# Patient Record
Sex: FEMALE | Race: BLACK OR AFRICAN AMERICAN | NOT HISPANIC OR LATINO
[De-identification: names, ages, dates, MRNs, and addresses within clinical notes are randomized per-mention and may not be internally consistent; named-entity substitution may affect disease eponyms.]

---

## 2019-08-03 ENCOUNTER — TRANSCRIPTION ENCOUNTER (OUTPATIENT)
Age: 24
End: 2019-08-03

## 2020-03-15 ENCOUNTER — EMERGENCY (EMERGENCY)
Facility: HOSPITAL | Age: 25
LOS: 1 days | Discharge: HOME | End: 2020-03-15
Attending: EMERGENCY MEDICINE
Payer: MEDICAID

## 2020-03-15 VITALS
SYSTOLIC BLOOD PRESSURE: 117 MMHG | RESPIRATION RATE: 16 BRPM | HEART RATE: 56 BPM | DIASTOLIC BLOOD PRESSURE: 75 MMHG | OXYGEN SATURATION: 100 % | TEMPERATURE: 98 F

## 2020-03-15 VITALS
HEART RATE: 72 BPM | RESPIRATION RATE: 16 BRPM | DIASTOLIC BLOOD PRESSURE: 62 MMHG | OXYGEN SATURATION: 100 % | TEMPERATURE: 99 F | SYSTOLIC BLOOD PRESSURE: 122 MMHG

## 2020-03-15 PROCEDURE — 93010 ELECTROCARDIOGRAM REPORT: CPT

## 2020-03-15 PROCEDURE — 99284 EMERGENCY DEPT VISIT MOD MDM: CPT

## 2020-03-15 PROCEDURE — 99283 EMERGENCY DEPT VISIT LOW MDM: CPT

## 2020-03-15 PROCEDURE — 93005 ELECTROCARDIOGRAM TRACING: CPT

## 2020-03-15 RX ORDER — MECLIZINE HCL 12.5 MG
1 TABLET ORAL
Qty: 12 | Refills: 0
Start: 2020-03-15 | End: 2020-03-18

## 2020-03-15 RX ORDER — IBUPROFEN 200 MG
600 TABLET ORAL ONCE
Refills: 0 | Status: COMPLETED | OUTPATIENT
Start: 2020-03-15 | End: 2020-03-15

## 2020-03-15 RX ORDER — MECLIZINE HCL 12.5 MG
25 TABLET ORAL ONCE
Refills: 0 | Status: COMPLETED | OUTPATIENT
Start: 2020-03-15 | End: 2020-03-15

## 2020-03-15 RX ORDER — ACETAMINOPHEN 500 MG
975 TABLET ORAL ONCE
Refills: 0 | Status: COMPLETED | OUTPATIENT
Start: 2020-03-15 | End: 2020-03-15

## 2020-03-15 RX ADMIN — Medication 600 MILLIGRAM(S): at 03:13

## 2020-03-15 RX ADMIN — Medication 25 MILLIGRAM(S): at 02:34

## 2020-03-15 RX ADMIN — Medication 975 MILLIGRAM(S): at 03:13

## 2020-03-15 NOTE — ED PROVIDER NOTE - ATTENDING CONTRIBUTION TO CARE
Attending MD Morales:  I personally have seen and examined this patient.  Resident note reviewed and agree on plan of care and except where noted.  See HPI, PE, and MDM for details.      24F with dizziness described as vertigo and intermitent chest pain x years. Non focal neuro exam, episodic/provoked vertigo thus do not suspect central etiology of symptoms. Chest pain atypical, ECG without ischemic changes, PERC negative so do not suspect PE. Plan for outpatient follow up with neurology and cardiology.

## 2020-03-15 NOTE — ED ADULT NURSE NOTE - HOW OFTEN DO YOU HAVE A DRINK CONTAINING ALCOHOL?
Monthly or less
I will STOP taking the medications listed below when I get home from the hospital:    Ventolin HFA 90 mcg/inh inhalation aerosol  -- 2 puff(s) inhaled 4 times a day    omeprazole 40 mg oral delayed release capsule  -- 1 cap(s) by mouth once a day    calcitriol 0.25 mcg oral capsule  -- 1 cap(s) by mouth once a day    meloxicam 7.5 mg oral tablet  -- 1 tab(s) by mouth once a day    alendronate weekly  --    Reglan 5 mg oral tablet  -- 1 tab(s) by mouth 4 times a day (before meals and at bedtime), As Needed    loratadine 10 mg oral tablet  -- 1 tab(s) by mouth once a day

## 2020-03-15 NOTE — ED PROVIDER NOTE - NSFOLLOWUPCLINICS_GEN_ALL_ED_FT
[facility]  Neurology  [address] [place] [address] [address] [place] [address]  Phone: [phone]  Fax:   Follow Up Time:

## 2020-03-15 NOTE — ED PROVIDER NOTE - PATIENT PORTAL LINK FT
You can access the FollowMyHealth Patient Portal offered by [facility] by registering at the following website: [email or web address]. By joining [employer]’s FollowMyHealth portal, you will also be able to view your health information using other applications (apps) compatible with our system.

## 2020-03-15 NOTE — ED PROVIDER NOTE - PROGRESS NOTE DETAILS
Jonathan Weil, PGY3 - Pt seen & reassessed. Remains clinically stable. Some improvement w/ meds. The patient feels comfortable going home at this juncture. Results from today's visit were reviewed with the patient and a copy of the results provided for their records. We discussed the plan for home care, the need for outpatient follow up, and return precautions. The patient expressed understanding of and agreement with the plan. All questions were addressed.

## 2020-03-15 NOTE — ED ADULT NURSE NOTE - OBJECTIVE STATEMENT
202 pt 24yf aox4 bib ems [fdny] c/o dizziness since 11am, room spinning, was sitting down at work, pt also states saw her pmd dr padgett wed 3/11/20 and started on tamiflu, denies exposure, vss no distress pending dispo/gc

## 2020-03-15 NOTE — ED PROVIDER NOTE - NSFOLLOWUPINSTRUCTIONS_ED_ALL_ED_FT
Follow up with your primary physician in 2-3 days. If needed call [phone] to find a primary care physician or call  [phone] to schedule an appointment with the general medicine clinic.     Return to the ER for worsening, constant, or intractable dizziness, vomiting, weakness, numbness, or any other new concerning symptoms.    If you were prescribed any medications please refer to the package insert for complete instructions on medication use and to review potential side effects. Please call the emergency department or speak with your primary physician if you have any additional questions regarding how to use this medication. Follow up with your primary physician or neurology in 2-3 days. If needed call [phone] to find a primary care physician or call  [phone] to schedule an appointment with the general medicine clinic.     Return to the ER for worsening, constant, or intractable dizziness, vomiting, weakness, numbness, or any other new concerning symptoms.    If you were prescribed any medications please refer to the package insert for complete instructions on medication use and to review potential side effects. Please call the emergency department or speak with your primary physician if you have any additional questions regarding how to use this medication.

## 2020-03-15 NOTE — ED PROVIDER NOTE - CARE PLAN
Principal Discharge DX:	BPPV (benign paroxysmal positional vertigo) Principal Discharge DX:	Vertigo  Secondary Diagnosis:	Chest pain

## 2020-03-15 NOTE — ED PROVIDER NOTE - CLINICAL SUMMARY MEDICAL DECISION MAKING FREE TEXT BOX
Jonathan Weil, PGY3 - intermittent, positional vertigo that resolves at rest, with a normal neuro exam, makes a central etiology very unlikely. Will treat supportively. ECG for chest pain, but no other evaluation given chronicity/no change from what she has had for several years.

## 2020-03-15 NOTE — ED ADULT NURSE NOTE - NSIMPLEMENTINTERV_GEN_ALL_ED
Implemented All Universal Safety Interventions:  [place] to call system. Call bell, personal items and telephone within reach. Instruct patient to call for assistance. Room bathroom lighting operational. Non-slip footwear when patient is off stretcher. Physically safe environment: no spills, clutter or unnecessary equipment. Stretcher in lowest position, wheels locked, appropriate side rails in place.

## 2020-03-15 NOTE — ED PROVIDER NOTE - OBJECTIVE STATEMENT
24F no sig PMH p/w dizziness for a couple days, described as an intermittent sensation of the room spinning, aggravated by standing and moving, resolved when she lies down. No weakness/numbness/vision changes. She also notes a substernal chest pain intermittently for a couple days, unable to characterize the quality of the pain, but she has had it on and off for the past several years. It is the same today as it has been over those years.

## 2020-06-02 PROBLEM — R42 DIZZINESS AND GIDDINESS: Chronic | Status: ACTIVE | Noted: 2020-03-15

## 2020-06-05 PROBLEM — Z00.00 ENCOUNTER FOR PREVENTIVE HEALTH EXAMINATION: Status: ACTIVE | Noted: 2020-06-05

## 2020-06-08 ENCOUNTER — APPOINTMENT (OUTPATIENT)
Dept: ORTHOPEDIC SURGERY | Facility: CLINIC | Age: 25
End: 2020-06-08
Payer: MEDICAID

## 2020-06-08 VITALS — DIASTOLIC BLOOD PRESSURE: 83 MMHG | HEART RATE: 108 BPM | SYSTOLIC BLOOD PRESSURE: 125 MMHG

## 2020-06-08 VITALS — HEIGHT: 63 IN | WEIGHT: 148 LBS | BODY MASS INDEX: 26.22 KG/M2

## 2020-06-08 VITALS — TEMPERATURE: 98.8 F

## 2020-06-08 DIAGNOSIS — G89.29 PAIN IN LEFT KNEE: ICD-10-CM

## 2020-06-08 DIAGNOSIS — M25.562 PAIN IN LEFT KNEE: ICD-10-CM

## 2020-06-08 PROCEDURE — 73562 X-RAY EXAM OF KNEE 3: CPT | Mod: LT

## 2020-06-08 PROCEDURE — 99203 OFFICE O/P NEW LOW 30 MIN: CPT

## 2020-06-08 RX ORDER — METRONIDAZOLE 500 MG/1
500 TABLET ORAL
Qty: 14 | Refills: 0 | Status: ACTIVE | COMMUNITY
Start: 2020-03-07

## 2020-06-08 RX ORDER — AZITHROMYCIN 250 MG/1
250 TABLET, FILM COATED ORAL
Qty: 6 | Refills: 0 | Status: ACTIVE | COMMUNITY
Start: 2020-03-19

## 2020-06-08 RX ORDER — OSELTAMIVIR PHOSPHATE 75 MG/1
75 CAPSULE ORAL
Qty: 10 | Refills: 0 | Status: ACTIVE | COMMUNITY
Start: 2020-03-12

## 2020-06-08 RX ORDER — MEDROXYPROGESTERONE ACETATE 150 MG/ML
150 INJECTION, SUSPENSION INTRAMUSCULAR
Qty: 1 | Refills: 0 | Status: ACTIVE | COMMUNITY
Start: 2020-02-17

## 2020-06-08 RX ORDER — RANITIDINE 150 MG/1
150 TABLET ORAL
Qty: 60 | Refills: 0 | Status: ACTIVE | COMMUNITY
Start: 2020-03-07

## 2020-06-08 RX ORDER — ERGOCALCIFEROL 1.25 MG/1
1.25 MG CAPSULE, LIQUID FILLED ORAL
Qty: 4 | Refills: 0 | Status: ACTIVE | COMMUNITY
Start: 2020-04-18

## 2020-06-08 RX ORDER — FAMOTIDINE 20 MG/1
20 TABLET, FILM COATED ORAL
Qty: 60 | Refills: 0 | Status: ACTIVE | COMMUNITY
Start: 2020-05-20

## 2020-06-08 RX ORDER — MECLIZINE HYDROCHLORIDE 25 MG/1
25 TABLET ORAL
Qty: 12 | Refills: 0 | Status: ACTIVE | COMMUNITY
Start: 2020-03-17

## 2020-06-08 RX ORDER — MELOXICAM 15 MG/1
15 TABLET ORAL
Qty: 30 | Refills: 0 | Status: ACTIVE | COMMUNITY
Start: 2020-06-01

## 2020-06-08 NOTE — DISCUSSION/SUMMARY
[de-identified] : 25-year-old female with left knee pain\par \par Patient presents with pain to the left knee with a palpable mechanical clunk on terminal extension.  There is squaring of the lateral femoral condyle that may be consistent with discoid meniscus to the lateral compartment with some instability.  Patient denies any trauma, but symptoms are consistent with anterior knee dysfunction with irritation of the joint line.\par \par Considering the palpable clunk with pain, recommendation is for MRI imaging of the left knee to determine degree of internal derangement and to stratify her for either conservative versus operative intervention.\par \par Patient voiced understanding.  MRI prescription provided.\par \par Follow-up after MRI

## 2020-06-08 NOTE — PHYSICAL EXAM
[de-identified] : Oriented to time, place, person\par Mood: Normal\par Affect: Normal\par Appearance: Healthy, well appearing, no acute distress.\par Gait: Normal\par Assistive Devices: None\par \par Left knee exam\par \par Skin: Clean, dry, intact\par Inspection: No obvious malalignment, no masses, minimal swelling, no effusion, palpable clunk at 10 degrees extension\par Pulses: 2+ DP/PT pulses\par ROM: 0-135 degrees of flexion. + Palpable clunk with pain with deep knee flexion/extension.\par Tenderness: Positive MJLT.  Positive LJLT.  Positive pain over the patella facets. No pain to the quadriceps tendon. No pain to the patella tendon. No posterior knee tenderness.\par Stability: Stable to varus, valgus. Negative lachman testing. Negative anterior drawer, negative posterior drawer.\par Strength: 5/5 Q/H/TA/GS/EHL, without atrophy\par Neuro: In tact to light touch throughout, DTR's normal\par Additional tests: Negative McMurrays test, Negative patellar grind test. \par  [de-identified] : \par The following radiographs were ordered and read by me during this patients visit. I reviewed each radiograph in detail with the patient and discussed the findings as highlighted below. \par \par 3 views of the left knee were obtained today that show no acute fracture or dislocation. There is no degenerative change seen. There is no significant malalignment.  Squaring of the lateral femoral condyle.

## 2020-06-08 NOTE — HISTORY OF PRESENT ILLNESS
[de-identified] : 25 year old female presents today with left knee pain x 3 years. No injury reported. The pain is constant worse with walking. Taking Advil for pain. Localizes pain to the front of the knee. Reports instability in the knee. Denies catching, locking, numbness or tingling.  Patient reports pain is worse when going to an extended position with a clunking sensation.  The sensation provides significant pain radiating through the anterior joint.\par \par The patient's past medical history, past surgical history, medications and allergies were reviewed by me today with the patient and documented accordingly. In addition, the patient's family and social history, which were noncontributory to this visit, were reviewed also.

## 2020-06-10 ENCOUNTER — OUTPATIENT (OUTPATIENT)
Dept: OUTPATIENT SERVICES | Facility: HOSPITAL | Age: 25
LOS: 1 days | End: 2020-06-10
Payer: MEDICAID

## 2020-06-10 ENCOUNTER — RESULT REVIEW (OUTPATIENT)
Age: 25
End: 2020-06-10

## 2020-06-10 ENCOUNTER — APPOINTMENT (OUTPATIENT)
Dept: MRI IMAGING | Facility: CLINIC | Age: 25
End: 2020-06-10
Payer: MEDICAID

## 2020-06-10 DIAGNOSIS — Z00.8 ENCOUNTER FOR OTHER GENERAL EXAMINATION: ICD-10-CM

## 2020-06-10 PROCEDURE — 73721 MRI JNT OF LWR EXTRE W/O DYE: CPT

## 2020-06-10 PROCEDURE — 73721 MRI JNT OF LWR EXTRE W/O DYE: CPT | Mod: 26,LT

## 2020-06-16 ENCOUNTER — APPOINTMENT (OUTPATIENT)
Dept: ORTHOPEDIC SURGERY | Facility: CLINIC | Age: 25
End: 2020-06-16
Payer: MEDICAID

## 2020-06-16 PROCEDURE — 99214 OFFICE O/P EST MOD 30 MIN: CPT | Mod: 95

## 2020-06-17 NOTE — REASON FOR VISIT
[Home] : at home, [unfilled] , at the time of the visit. [Verbal consent obtained from patient] : the patient, [unfilled] [Medical Office: ([facility])___] : at the medical office located in  [FreeTextEntry2] : Left knee pain [FreeTextEntry4] : Zoey Ardon [Follow-Up Visit] : a follow-up visit for

## 2020-06-17 NOTE — HISTORY OF PRESENT ILLNESS
[de-identified] : 25 year old female presents today for follow up of chronic left knee pain. She obtained MRI and is here for review of results. No injury reported. The pain is constant worse with walking. Taking Advil for pain. Localizes pain to the front lateral aspect of the knee. Reports instability in the knee. Continues to report pain is worse when going to an extended position with a clunking sensation.  The sensation provides significant pain radiating through the anterior joint.\par \par

## 2020-06-17 NOTE — DISCUSSION/SUMMARY
[de-identified] : 25-year-old female with left knee lateral discoid meniscus tear\par \par Patient has mechanical symptoms on clinical examination with palpable clunk with terminal extension.  Consideration was for likely discoid meniscus with instability and degenerative changes at the last visit.  MRI confirms that there is a partial discoid meniscus with degeneration of the posterior horn and body of the lateral meniscus.  There is likely instability causing her mechanical condition on extension.  Recommendation is for consideration of surgical intervention with surgical debridement and saucerization of the lateral meniscus, with excision of torn fragments and stabilization.\par \par All risks, benefits and alternatives to the proposed surgical procedure, left knee arthroscopy partial meniscectomy, as well as the need for formal post-operative rehabilitation were discussed in great detail with the patient. Risks include but are not limited to pain, bleeding, infection, neurovascular injury, stiffness, continued instability, medical complications (including DVT, PE, MI), and risks of anesthesia. \par \par A discussion was also had with the patient regarding additional risk given recent Covid-19 pandemic; including the potential additional risk of anesthesia and any medical comorbidities that the patient has.  It was made clear to the patient that we are taking all precautions regarding Covid-19 with regards to surgical scheduling and perioperative care.  The patient understands that current protocol requires Covid-19 testing no more than 48hrs prior to surgery, and PST's may be performed onsite at the day of surgery. \par \par The patient expressed understanding and all questions were answered. The patient is electing to proceed, and will have the patient scheduled accordingly.\par

## 2020-06-17 NOTE — PHYSICAL EXAM
[de-identified] : Oriented to time, place, person\par Mood: Normal\par Affect: Normal\par Appearance: Healthy, well appearing, no acute distress.\par Gait: Normal\par Assistive Devices: None\par \par Left knee exam\par \par Skin: Clean, dry, intact\par Inspection: No obvious malalignment, no masses, minimal swelling, no effusion, palpable clunk at 10 degrees extension\par Pulses: Pink perfused\par ROM: 0-135 degrees of flexion. + Palpable clunk with pain with deep knee flexion/extension.\par Tenderness: Positive MJLT.  Positive LJLT.  Positive pain over the patella facets. No pain to the quadriceps tendon. No pain to the patella tendon. No posterior knee tenderness.\par Stability: Stable to varus, valgus. Negative lachman testing. Negative anterior drawer, negative posterior drawer.\par Strength: 5/5 Q/H/TA/GS/EHL, without atrophy\par Neuro: In tact to light touch throughout, DTR's normal\par Additional tests: Negative McMurrays test, Negative patellar grind test. \par  [de-identified] : 3 views of the left knee were obtained today that show no acute fracture or dislocation. There is no degenerative change seen. There is no significant malalignment.  Squaring of the lateral femoral condyle.\par \par MRI left knee dated 6.10.20 shows evidence of partially discoid lateral meniscus with longitudinal tear at the periphery of the posterior horn with an associated large meniscal cyst.

## 2020-06-30 ENCOUNTER — OUTPATIENT (OUTPATIENT)
Dept: OUTPATIENT SERVICES | Facility: HOSPITAL | Age: 25
LOS: 1 days | End: 2020-06-30
Payer: MEDICAID

## 2020-06-30 VITALS
HEIGHT: 63 IN | RESPIRATION RATE: 16 BRPM | SYSTOLIC BLOOD PRESSURE: 104 MMHG | TEMPERATURE: 98 F | HEART RATE: 78 BPM | WEIGHT: 151.9 LBS | OXYGEN SATURATION: 98 % | DIASTOLIC BLOOD PRESSURE: 78 MMHG

## 2020-06-30 DIAGNOSIS — Q68.6 DISCOID MENISCUS: ICD-10-CM

## 2020-06-30 DIAGNOSIS — R00.2 PALPITATIONS: ICD-10-CM

## 2020-06-30 LAB
HCG UR-SCNC: NEGATIVE — SIGNIFICANT CHANGE UP
HCT VFR BLD CALC: 40.8 % — SIGNIFICANT CHANGE UP (ref 34.5–45)
HGB BLD-MCNC: 13.6 G/DL — SIGNIFICANT CHANGE UP (ref 11.5–15.5)
MCHC RBC-ENTMCNC: 31.2 PG — SIGNIFICANT CHANGE UP (ref 27–34)
MCHC RBC-ENTMCNC: 33.3 % — SIGNIFICANT CHANGE UP (ref 32–36)
MCV RBC AUTO: 93.6 FL — SIGNIFICANT CHANGE UP (ref 80–100)
NRBC # FLD: 0 K/UL — SIGNIFICANT CHANGE UP (ref 0–0)
PLATELET # BLD AUTO: 227 K/UL — SIGNIFICANT CHANGE UP (ref 150–400)
PMV BLD: 10.3 FL — SIGNIFICANT CHANGE UP (ref 7–13)
RBC # BLD: 4.36 M/UL — SIGNIFICANT CHANGE UP (ref 3.8–5.2)
RBC # FLD: 12.6 % — SIGNIFICANT CHANGE UP (ref 10.3–14.5)
SP GR UR: 1.02 — SIGNIFICANT CHANGE UP (ref 1–1.04)
WBC # BLD: 5.07 K/UL — SIGNIFICANT CHANGE UP (ref 3.8–10.5)
WBC # FLD AUTO: 5.07 K/UL — SIGNIFICANT CHANGE UP (ref 3.8–10.5)

## 2020-06-30 PROCEDURE — 93010 ELECTROCARDIOGRAM REPORT: CPT

## 2020-06-30 NOTE — H&P PST ADULT - HISTORY OF PRESENT ILLNESS
26 y/o female presents to PST preop for left knee arthroscopic partial lateral menisectomy on 7/7/2020. preop dx of discoid meniscus. pt reports progressive left knee pain that is worse with standing and walking. pt denies previous fall or traumatic injury to knee.

## 2020-06-30 NOTE — H&P PST ADULT - MUSCULOSKELETAL
details… detailed exam no calf tenderness/decreased ROM due to pain/left knee/diminished strength/joint swelling

## 2020-06-30 NOTE — H&P PST ADULT - NSICDXPROBLEM_GEN_ALL_CORE_FT
PROBLEM DIAGNOSES  Problem: Discoid meniscus  Assessment and Plan: preop for left knee arthroscopic partial lateral menisectomy on 7/7/2020  preop instructions given, pt verbalized understanding   pt will take prescribed famotidine for GI prophylaxis. clorhexidine wash provided   pt scheduled for COVID testing on 7/4/2020    Problem: Palpitations  Assessment and Plan: medical clearance requested. pt reports occasional palpitations at rest

## 2020-06-30 NOTE — H&P PST ADULT - NEGATIVE GENERAL GENITOURINARY SYMPTOMS
no flank pain R/normal urinary frequency/no flank pain L/no hematuria/no dysuria/no urinary hesitancy

## 2020-07-03 DIAGNOSIS — Z01.818 ENCOUNTER FOR OTHER PREPROCEDURAL EXAMINATION: ICD-10-CM

## 2020-07-04 ENCOUNTER — APPOINTMENT (OUTPATIENT)
Dept: DISASTER EMERGENCY | Facility: CLINIC | Age: 25
End: 2020-07-04

## 2020-07-05 LAB — SARS-COV-2 N GENE NPH QL NAA+PROBE: NOT DETECTED

## 2020-07-06 ENCOUNTER — TRANSCRIPTION ENCOUNTER (OUTPATIENT)
Age: 25
End: 2020-07-06

## 2020-07-06 RX ORDER — OXYCODONE AND ACETAMINOPHEN 5; 325 MG/1; MG/1
5-325 TABLET ORAL
Qty: 20 | Refills: 0 | Status: ACTIVE | COMMUNITY
Start: 2020-07-06 | End: 1900-01-01

## 2020-07-07 ENCOUNTER — APPOINTMENT (OUTPATIENT)
Dept: ORTHOPEDIC SURGERY | Facility: AMBULATORY SURGERY CENTER | Age: 25
End: 2020-07-07

## 2020-07-07 ENCOUNTER — OUTPATIENT (OUTPATIENT)
Dept: OUTPATIENT SERVICES | Facility: HOSPITAL | Age: 25
LOS: 1 days | Discharge: HOME | End: 2020-07-07
Payer: MEDICAID

## 2020-07-07 VITALS
HEIGHT: 63 IN | OXYGEN SATURATION: 100 % | SYSTOLIC BLOOD PRESSURE: 127 MMHG | HEART RATE: 85 BPM | TEMPERATURE: 99 F | DIASTOLIC BLOOD PRESSURE: 71 MMHG | RESPIRATION RATE: 18 BRPM | WEIGHT: 151.9 LBS

## 2020-07-07 VITALS
DIASTOLIC BLOOD PRESSURE: 87 MMHG | TEMPERATURE: 98 F | HEART RATE: 73 BPM | SYSTOLIC BLOOD PRESSURE: 125 MMHG | RESPIRATION RATE: 16 BRPM | OXYGEN SATURATION: 98 %

## 2020-07-07 DIAGNOSIS — Q68.6 DISCOID MENISCUS: ICD-10-CM

## 2020-07-07 PROCEDURE — 29882 ARTHRS KNE SRG MNISC RPR M/L: CPT | Mod: LT

## 2020-07-07 RX ORDER — SODIUM CHLORIDE 9 MG/ML
1000 INJECTION, SOLUTION INTRAVENOUS
Refills: 0 | Status: DISCONTINUED | OUTPATIENT
Start: 2020-07-07 | End: 2020-07-22

## 2020-07-07 RX ORDER — FAMOTIDINE 10 MG/ML
1 INJECTION INTRAVENOUS
Qty: 0 | Refills: 0 | DISCHARGE

## 2020-07-07 RX ORDER — MEDROXYPROGESTERONE ACETATE 150 MG/ML
0 INJECTION, SUSPENSION, EXTENDED RELEASE INTRAMUSCULAR
Qty: 0 | Refills: 0 | DISCHARGE

## 2020-07-07 NOTE — ASU DISCHARGE PLAN (ADULT/PEDIATRIC) - CARE PROVIDER_API CALL
Kenn Oliveira  ORTHOPEDICS  [address] [place] [address]  Phone: [phone]  Fax: [phone]  Follow Up Time:

## 2020-07-07 NOTE — ASU DISCHARGE PLAN (ADULT/PEDIATRIC) - ASU DC SPECIAL INSTRUCTIONSFT
Follow up with Dr. Oliveira in 10-14 days  Toe touch weight bearing Left lower extremity with crutches and knee immobilizer  See instruction sheet

## 2020-07-07 NOTE — BRIEF OPERATIVE NOTE - OPERATION/FINDINGS
L knee lateral meniscus partial meniscectomy and meniscal repair for discoid meniscus with undersurface tear

## 2020-07-07 NOTE — ASU DISCHARGE PLAN (ADULT/PEDIATRIC) - PAIN MANAGEMENT
Prescription called to pharmacy Prescription called to pharmacy/Do not mix Percocet with Tylenol (acetaminophen) as it already contains Tylenol. narcotic pain medicine is constipating,buy over the counter stool softener and take as instructed on the bottle

## 2020-07-07 NOTE — ASU DISCHARGE PLAN (ADULT/PEDIATRIC) - CALL YOUR DOCTOR IF YOU HAVE ANY OF THE FOLLOWING:
Bleeding that does not stop/Pain not relieved by Medications/Numbness, tingling, color or temperature change to extremity/Nausea and vomiting that does not stop/Wound/Surgical Site with redness, or foul smelling discharge or pus Swelling that gets worse/Nausea and vomiting that does not stop/Numbness, tingling, color or temperature change to extremity/Fever greater than (need to indicate Fahrenheit or Celsius)/Bleeding that does not stop/Pain not relieved by Medications/Wound/Surgical Site with redness, or foul smelling discharge or pus

## 2020-07-07 NOTE — ASU DISCHARGE PLAN (ADULT/PEDIATRIC) - ACTIVITY LEVEL
Toe touch weight bearing in KI/No heavy lifting/No sports/gym No sports/gym/No heavy lifting/Elevate extremity/Toe touch weight bearing in KI. apply ice 20minutes ON 20minutes OFF

## 2020-07-20 ENCOUNTER — APPOINTMENT (OUTPATIENT)
Dept: ORTHOPEDIC SURGERY | Facility: CLINIC | Age: 25
End: 2020-07-20
Payer: MEDICAID

## 2020-07-20 VITALS — TEMPERATURE: 98.1 F

## 2020-07-20 PROBLEM — K21.9 GASTRO-ESOPHAGEAL REFLUX DISEASE WITHOUT ESOPHAGITIS: Chronic | Status: ACTIVE | Noted: 2020-06-30

## 2020-07-20 PROBLEM — Q68.6 DISCOID MENISCUS: Chronic | Status: ACTIVE | Noted: 2020-06-30

## 2020-07-20 PROCEDURE — 99024 POSTOP FOLLOW-UP VISIT: CPT

## 2020-07-20 NOTE — HISTORY OF PRESENT ILLNESS
[3] : the patient reports pain that is 3/10 in severity [Chills] : no chills [Fever] : no fever [Nausea] : no nausea [Vomiting] : no vomiting [Clean/Dry/Intact] : clean, dry and intact [Healed] : healed [Erythema] : not erythematous [Discharge] : absent of discharge [Swelling] : swollen [Dehiscence] : not dehisced [Neuro Intact] : an unremarkable neurological exam [Vascular Intact] : ~T peripheral vascular exam normal [Doing Well] : is doing well [Sutures Removed] : sutures were removed [No Sign of Infection] : is showing no signs of infection [Excellent Pain Control] : has excellent pain control [Steri-Strips Removed & Replaced] : steri-strips removed and replaced [de-identified] : 25-year-old female status post left knee lateral meniscus saucerization/repair 7/7/20 [de-identified] : Left knee exam\par \par Skin: Incision(s) clean, dry, intact, no drainage, healed\par Inspection: Residual swelling, moderate residual effusion\par Pulses: 2+ DP/PT pulses\par ROM: 0-45\par Tenderness: Tender to palpation through the lateral joint\par Stability: Stable\par Strength: Intact Q/H/TA/GS/EHL\par Neuro: In tact to light touch throughout [de-identified] : 25-year-old female status post left knee lateral meniscus saucerization/repair. She is doing well. Presents in [place] brace and is ambulating via crutches. Taking Aleve for pain. Denies post op complications. [de-identified] : 25-year-old female status post left knee lateral meniscus saucerization/repair\par \par The patient presents for the first postoperative visit following meniscus repair. All intraoperative imaging was discussed in detail with the patient including the quality and nature of the meniscus injury as well as the quality of the chondral surfaces. Discussion was also centered upon the rehabilitation required following arthroscopic meniscus repair of cleavage tears as well as the instability seen on clinical presentation, and patient is agreeable to treatment plan.\par \par Recommendations:\par 1. PT: TTWB x2wks, progress to FWB at 4wks post-op. AAROM/AROM to tolerance with immediate full ROM as goal. Meniscus repair precautions\par 2. Therapeutic exercises: Quad/Hamstring sets, co-contractions, SLR, HEP.\par 3. Meds: Wean pain medication, transition to OTC NSAID's/tylenol as tolerated\par 4. Bracing: [place]/crutches\par \par Follow up 4wks for re-evaluation.

## 2020-08-17 ENCOUNTER — APPOINTMENT (OUTPATIENT)
Dept: ORTHOPEDIC SURGERY | Facility: CLINIC | Age: 25
End: 2020-08-17
Payer: MEDICAID

## 2020-08-17 VITALS — TEMPERATURE: 97.2 F

## 2020-08-17 PROCEDURE — 99024 POSTOP FOLLOW-UP VISIT: CPT

## 2020-08-18 NOTE — HISTORY OF PRESENT ILLNESS
[1] : the patient reports pain that is 1/10 in severity [Clean/Dry/Intact] : clean, dry and intact [Healed] : healed [Swelling] : swollen [Neuro Intact] : an unremarkable neurological exam [Vascular Intact] : ~T peripheral vascular exam normal [Excellent Pain Control] : has excellent pain control [Doing Well] : is doing well [No Sign of Infection] : is showing no signs of infection [Chills] : no chills [Fever] : no fever [Nausea] : no nausea [Erythema] : not erythematous [Vomiting] : no vomiting [Discharge] : absent of discharge [Dehiscence] : not dehisced [de-identified] : 25-year-old female status post left knee lateral meniscus saucerization/repair. She is doing well. Presents in [place] brace and is ambulating via crutches. Taking Aleve for pain. Attending PT 2x per week.  Reports pin with exercises in PT. Denies post op complications. [de-identified] : 25-year-old female status post left knee lateral meniscus saucerization/repair 7/7/20 [de-identified] : Left knee exam\par \par Skin: Incision(s) clean, dry, intact, no drainage, healed\par Inspection: Residual swelling, moderate residual effusion\par Pulses: 2+ DP/PT pulses\par ROM: 0-90\par Tenderness: Tender to palpation through the lateral joint\par Stability: Stable\par Strength: Intact Q/H/TA/GS/EHL\par Neuro: In tact to light touch throughout [de-identified] : 25-year-old female status post left knee lateral meniscus saucerization/repair\par \par Patient is doing well, and slowly progressing with physical therapy.  Patient has some residual loss of motion, and is continuing to utilize crutches.  Recommendation at this time is for discontinuation of crutches and to improve range of motion/function with aggressive rehab.\par \par Recommendations:\par 1. PT: Weightbearing as tolerated.  AAROM/AROM to tolerance with immediate full ROM as goal. \par 2. Therapeutic exercises: Quad/Hamstring sets, co-contractions, SLR, HEP.\par 3. Meds: OTC NSAID's/tylenol as tolerated\par 4. Bracing: Discontinue [place]/crutches\par \par Follow up 6wks for re-evaluation.

## 2020-09-28 ENCOUNTER — APPOINTMENT (OUTPATIENT)
Dept: ORTHOPEDIC SURGERY | Facility: CLINIC | Age: 25
End: 2020-09-28
Payer: MEDICAID

## 2020-09-28 VITALS — TEMPERATURE: 97.9 F

## 2020-09-28 PROCEDURE — 99024 POSTOP FOLLOW-UP VISIT: CPT

## 2020-09-28 RX ORDER — DICLOFENAC SODIUM 75 MG/1
75 TABLET, DELAYED RELEASE ORAL TWICE DAILY
Qty: 30 | Refills: 1 | Status: ACTIVE | COMMUNITY
Start: 2020-09-28 | End: 1900-01-01

## 2020-09-29 NOTE — HISTORY OF PRESENT ILLNESS
[1] : the patient reports pain that is 1/10 in severity [Clean/Dry/Intact] : clean, dry and intact [Healed] : healed [Neuro Intact] : an unremarkable neurological exam [Vascular Intact] : ~T peripheral vascular exam normal [Doing Well] : is doing well [Excellent Pain Control] : has excellent pain control [No Sign of Infection] : is showing no signs of infection [Chills] : no chills [Fever] : no fever [Nausea] : no nausea [Vomiting] : no vomiting [Erythema] : not erythematous [Discharge] : absent of discharge [Dehiscence] : not dehisced [de-identified] : 25-year-old female status post left knee lateral meniscus saucerization/repair 7/7/20 [de-identified] : 25-year-old female status post left knee lateral meniscus saucerization/repair. She is doing well. Attending PT 2x per week and has been progressing well. She still has some pain with flexion. She is taking Aleve with minimal relief. Denies post op complications. [de-identified] : Left knee exam\par \par Skin: Incision(s) clean, dry, intact, no drainage, healed\par Inspection: Min effusion\par Pulses: 2+ DP/PT pulses\par ROM: 0-120 degrees of flexion.\par Tenderness: Tender to palpation through the lateral joint\par Stability: Stable\par Strength: Intact Q/H/TA/GS/EHL with moderate atrophy\par Neuro: In tact to light touch throughout [de-identified] : 25-year-old female status post left knee lateral meniscus saucerization/repair\par \par Patient is doing well, and has made significant progress with range of motion.  There is minimal dysfunction compared to the contralateral side.  Patient still has some mild discomfort with high flexion angles, as well as some discomfort through the lateral knee.  Patient is also lost significant quadriceps strength.\par \par Recommendations:\par 1. PT: As indicated.  Begin light strengthening and exercise program\par 2. Therapeutic exercises: Quad/Hamstring sets, co-contractions, SLR, HEP.\par 3. Meds: OTC NSAID's/tylenol as tolerated\par \par Follow up 2 months

## 2020-09-29 NOTE — END OF VISIT
[FreeTextEntry3] : This note was written by Susie Glover on 09/28/2020 acting solely as a scribe for Dr. Wilfrid Rendon.\par  \par All medical record entries made by the Scribe were at my, Dr. Wilfrid Rendon, direction and personally dictated by me on 09/28/2020. I have personally reviewed the chart and agree that the record accurately reflects my personal performance of the history, physical exam, assessment and plan.

## 2020-12-09 ENCOUNTER — APPOINTMENT (OUTPATIENT)
Dept: ORTHOPEDIC SURGERY | Facility: CLINIC | Age: 25
End: 2020-12-09

## 2020-12-09 VITALS — TEMPERATURE: 97.3 F

## 2020-12-10 ENCOUNTER — APPOINTMENT (OUTPATIENT)
Dept: ORTHOPEDIC SURGERY | Facility: CLINIC | Age: 25
End: 2020-12-10

## 2021-01-07 ENCOUNTER — APPOINTMENT (OUTPATIENT)
Dept: ORTHOPEDIC SURGERY | Facility: CLINIC | Age: 26
End: 2021-01-07
Payer: MEDICAID

## 2021-01-07 VITALS — TEMPERATURE: 97.6 F

## 2021-01-07 DIAGNOSIS — S83.282A OTHER TEAR OF LATERAL MENISCUS, CURRENT INJURY, LEFT KNEE, INITIAL ENCOUNTER: ICD-10-CM

## 2021-01-07 PROCEDURE — 99213 OFFICE O/P EST LOW 20 MIN: CPT

## 2021-01-07 PROCEDURE — 99072 ADDL SUPL MATRL&STAF TM PHE: CPT

## 2021-01-08 PROBLEM — S83.282A TEAR OF LATERAL MENISCUS OF LEFT KNEE, INITIAL ENCOUNTER: Status: ACTIVE | Noted: 2020-06-17

## 2021-01-08 NOTE — REASON FOR VISIT
[Follow-Up Visit] : a follow-up visit for [FreeTextEntry2] : status post left knee lateral meniscus saucerization/repair 7/7/20.

## 2021-01-08 NOTE — PHYSICAL EXAM
[de-identified] : Oriented to time, place, person\par Mood: Normal\par Affect: Normal\par Appearance: Healthy, well appearing, no acute distress.\par Gait: Normal\par Assistive Devices: None \par \par Left Knee Exam:\par \par Skin: Clean, dry, intact\par Inspection: No obvious malalignment, no masses, mild swelling, trace effusion\par Pulses: 2+ DP/PT pulses \par ROM: 0-130 degrees of flexion. No pain with deep knee flexion/extension.\par Tenderness: No MJLT. + LJLT. No pain over the patella facets. No pain to the quadriceps tendon. No pain to the patella tendon. No posterior knee tenderness.\par Stability: Stable to varus, valgus. Negative Lachman testing. Negative anterior drawer, negative posterior drawer.\par Strength: 5/5 Q/H/TA/GS/EHL, without atrophy\par Neuro: Intact to light touch throughout, DTRs normal\par Additional Tests: Negative Skyler's test, Negative patellar grind test

## 2021-01-08 NOTE — HISTORY OF PRESENT ILLNESS
[de-identified] : 25-year-old female status post left knee lateral meniscus saucerization/repair. She is doing well but still c/o pain with walking and at night. Pain is localized to lateral knee. She is taking  Diclofenac with relief. Denies post op complications. The patient reports pain that is 5/10 in severity. Reports some stiffness.  Denies instability. She completed PT in October 2020.

## 2021-01-08 NOTE — ADDENDUM
[FreeTextEntry1] : This note was written by Quynh Shin on 01/07/2021 acting solely as a scribe for Dr. Kenn Oliveira.\par \par All medical record entries made by the Scribe were at my, Dr. Kenn Oliveira, direction and personally dictated by me on 01/07/2021. I have personally reviewed the chart and agree that the record accurately reflects my personal performance of the history, physical exam, assessment and plan.

## 2021-01-08 NOTE — DISCUSSION/SUMMARY
[de-identified] : 25-year-old female status post left knee lateral meniscus saucerization/repair. \par \par Clinically, the patient has significant improvement of her pain and symptoms throughout the left lower extremity.  However, patient still reports moderate pain through the lateral joint with impact loading exercise, as well as at night.  ROM has improved significantly.  Given the complexity of the meniscus repair, I discussed the possibility of it not healing fully, and I also discussed potential surgical vs. continued nonsurgical intervention. Patient wants to continue with conservative management at this time.  Could investigate further with MRI imaging if symptoms persist/worsen.\par \par Recommendations: Continue home exercise program.  Ice/NSAIDs as needed\par \par Follow-up in 3 months if symptoms persists for further evaluation.

## 2021-02-03 ENCOUNTER — TRANSCRIPTION ENCOUNTER (OUTPATIENT)
Age: 26
End: 2021-02-03

## 2021-07-15 ENCOUNTER — TRANSCRIPTION ENCOUNTER (OUTPATIENT)
Age: 26
End: 2021-07-15

## 2022-05-26 ENCOUNTER — APPOINTMENT (OUTPATIENT)
Dept: ORTHOPEDIC SURGERY | Facility: CLINIC | Age: 27
End: 2022-05-26
Payer: MEDICAID

## 2022-05-26 VITALS — HEIGHT: 63 IN | BODY MASS INDEX: 26.58 KG/M2 | WEIGHT: 150 LBS

## 2022-05-26 DIAGNOSIS — Q68.6 DISCOID MENISCUS: ICD-10-CM

## 2022-05-26 PROCEDURE — 99213 OFFICE O/P EST LOW 20 MIN: CPT

## 2022-05-31 PROBLEM — Q68.6 DISCOID MENISCUS OF LEFT KNEE: Status: ACTIVE | Noted: 2020-06-17

## 2022-05-31 NOTE — PHYSICAL EXAM
[de-identified] : Oriented to time, place, person\par Mood: Normal\par Affect: Normal\par Appearance: Healthy, well appearing, no acute distress.\par Gait: Normal\par Assistive Devices: None \par \par Left Knee Exam:\par \par Skin: Clean, dry, intact\par Inspection: No obvious malalignment, no masses, mild swelling, trace effusion\par Pulses: 2+ DP/PT pulses \par ROM: 0-130 degrees of flexion. No pain with deep knee flexion/extension.\par Tenderness: No MJLT. + LJLT. No pain over the patella facets. No pain to the quadriceps tendon. No pain to the patella tendon. No posterior knee tenderness.\par Stability: Stable to varus, valgus. Negative Lachman testing. Negative anterior drawer, negative posterior drawer.\par Strength: 5/5 Q/H/TA/GS/EHL, without atrophy\par Neuro: Intact to light touch throughout, DTRs normal\par Additional Tests: Negative Skyler's test, Negative patellar grind test

## 2022-05-31 NOTE — DISCUSSION/SUMMARY
[de-identified] : 27-year-old female status post left knee lateral meniscus saucerization/repair. \par \par Patient still reports pain through the lateral joint with impact loading exercise.  Given the complexity of the meniscus repair, I discussed the possibility of persistent degeneration of the lateral compartment, and possible incomplete healing of the horizontal cleavage tear through the posterior horn.  We discussed consideration of MRI imaging at this time to further evaluate.\par \par Recommendations: Activity to tolerance.  Ice/NSAIDs as needed. MRI imaging\par \par Follow-up after MRI

## 2022-05-31 NOTE — HISTORY OF PRESENT ILLNESS
[de-identified] : 25-year-old female status post left knee lateral meniscus saucerization/repair here with return of pain x 3 weeks. No recent injury reported. The pain is constant worse with walking.  She is taking  Tylenol with some relief. Denies post op complications. The patient reports pain that is 9/10 in severity.  Denies instability. She completed PT in October 2020.

## 2022-06-23 ENCOUNTER — APPOINTMENT (OUTPATIENT)
Dept: MRI IMAGING | Facility: CLINIC | Age: 27
End: 2022-06-23

## 2022-07-28 NOTE — ED ADULT NURSE NOTE - NS ED NURSE LEVEL OF CONSCIOUSNESS SPEECH
[FreeTextEntry1] : here for followup of diabetes, hyperlipidemia\par having active w/u of anemia; started on iron by heme\par found to have fatty liver; told of need for low potassium diet\par needs Pvax 23 in JUly [de-identified] : c/o Non Insulin Dependent Diabetes Mellitus. \par      Denies : Patient denies polyuria, polydipsia and polyphagia.   The patient's weight has CHANGED BY -4\par      The patient has been diagnosed with diabetes since 1995. The frequency of the monitoring schedule is frequently using Freestyle. Hypoglycemic episodes are none known. The results of the last Hbg A1c are 7.2 Apr,  6.7 Jan 2022; 6.8 Oct, 6.8 July, 7.1 Apr, 7.3 Jan 2021; 7.1 Oct, 7.3 Dec, 7.9 Aug,  8.4 Jan 2019;  9.4 Oct, 9.3 July, 11.3 Mar 2018. Side effects of the medications include NONE. Compliance with the medical regimen has been GOOD. \par      c/o Hypercholesterolemia Last LDL 62 Apr\par Patient denies myalgias. \par      The lipid profile goals for the patient are LDL less than 100 if not 70mg/dl. Dietary modifications have included reduced fat intake . Exercise modifications have included participation in walking . Response to the medications has been fair.\par      Signs of abuse or neglect? No. Fall Risk/History of Falling No.  Speaking Coherently

## 2023-05-24 ENCOUNTER — APPOINTMENT (OUTPATIENT)
Dept: ORTHOPEDIC SURGERY | Facility: CLINIC | Age: 28
End: 2023-05-24
Payer: MEDICAID

## 2023-05-24 VITALS — TEMPERATURE: 98 F | BODY MASS INDEX: 26.58 KG/M2 | WEIGHT: 150 LBS | HEIGHT: 63 IN | OXYGEN SATURATION: 99 %

## 2023-05-24 DIAGNOSIS — M47.814 SPONDYLOSIS W/OUT MYELOPATHY OR RADICULOPATHY, THORACIC REGION: ICD-10-CM

## 2023-05-24 PROCEDURE — 99214 OFFICE O/P EST MOD 30 MIN: CPT

## 2023-05-24 RX ORDER — CYCLOBENZAPRINE HYDROCHLORIDE 10 MG/1
10 TABLET, FILM COATED ORAL 3 TIMES DAILY
Qty: 30 | Refills: 0 | Status: ACTIVE | COMMUNITY
Start: 2023-05-24 | End: 1900-01-01

## 2023-05-24 NOTE — PHYSICAL EXAM
[de-identified] : Examination of the thoracic and lumbar spine reveals no midline tenderness palpation, step-offs, or skin lesions. Decreased range of motion with respect to flexion, extension, lateral bending, and rotation. No tenderness to palpation of the sciatic notch. No tenderness palpation of the bilateral greater trochanters. No pain with passive internal/external rotation of the hips. No instability of bilateral lower extremities.  Negative HERLINDA. Negative straight leg raise bilaterally. No bowstring. Negative femoral stretch. 5 out of 5 iliopsoas, hip abductors, hips adductors, quadriceps, hamstrings, gastrocsoleus, tibialis anterior, extensor hallucis longus, peroneals. Grossly intact sensation to light touch bilateral lower extremities. 1+ patellar and Achilles reflexes. Downgoing Babinski. No clonus. Intact proprioception. Palpable pulses. No skin lesion and no edema on the right and left lower extremities. [de-identified] : AP lateral thoracic x-rays reveal some spondylosis without instability or aggressive lesions.

## 2023-05-24 NOTE — HISTORY OF PRESENT ILLNESS
[de-identified] : Ms. LV FIGUEROA  is a 28 year old female who presents with upper thoracic pain for the past year which is getting worse over time.  Denies any UE radicular symptoms or any pain/numbness/tingling wrapping around her chest.  Normal bowel and bladder control.   Denies any recent fevers, chills, sweats, weight loss, or infection.  She has tried naprosyn and Tylenol with minimal relief.\par \par The patients past medical history, past surgical history, medications, allergies, and social history were reviewed by me today with the patient and documented accordingly.  In addition, the patient's family history, which is noncontributory to their visit, was also reviewed.\par

## 2023-05-24 NOTE — DISCUSSION/SUMMARY
[de-identified] : We discussed further treatment options.  She will try course of physical therapy and the muscle relaxant.  MRI if not improved or worsen.

## 2023-07-18 ENCOUNTER — NON-APPOINTMENT (OUTPATIENT)
Age: 28
End: 2023-07-18

## 2023-09-21 ENCOUNTER — EMERGENCY (EMERGENCY)
Facility: HOSPITAL | Age: 28
LOS: 1 days | Discharge: HOME | End: 2023-09-21
Attending: STUDENT IN AN ORGANIZED HEALTH CARE EDUCATION/TRAINING PROGRAM
Payer: COMMERCIAL

## 2023-09-21 VITALS
HEIGHT: 62 IN | DIASTOLIC BLOOD PRESSURE: 85 MMHG | TEMPERATURE: 99 F | OXYGEN SATURATION: 100 % | HEART RATE: 81 BPM | RESPIRATION RATE: 19 BRPM | WEIGHT: 149.91 LBS | SYSTOLIC BLOOD PRESSURE: 133 MMHG

## 2023-09-21 VITALS
OXYGEN SATURATION: 100 % | SYSTOLIC BLOOD PRESSURE: 128 MMHG | TEMPERATURE: 99 F | HEART RATE: 75 BPM | DIASTOLIC BLOOD PRESSURE: 84 MMHG | RESPIRATION RATE: 18 BRPM

## 2023-09-21 PROCEDURE — 73610 X-RAY EXAM OF ANKLE: CPT | Mod: 26,LT

## 2023-09-21 PROCEDURE — 73630 X-RAY EXAM OF FOOT: CPT | Mod: 26,LT

## 2023-09-21 PROCEDURE — 99284 EMERGENCY DEPT VISIT MOD MDM: CPT

## 2023-09-21 PROCEDURE — 73610 X-RAY EXAM OF ANKLE: CPT

## 2023-09-21 PROCEDURE — 73630 X-RAY EXAM OF FOOT: CPT

## 2023-09-21 PROCEDURE — 73590 X-RAY EXAM OF LOWER LEG: CPT | Mod: 26,LT

## 2023-09-21 PROCEDURE — 73590 X-RAY EXAM OF LOWER LEG: CPT

## 2023-09-21 PROCEDURE — 99284 EMERGENCY DEPT VISIT MOD MDM: CPT | Mod: 25

## 2023-09-21 RX ORDER — IBUPROFEN 200 MG
600 TABLET ORAL ONCE
Refills: 0 | Status: COMPLETED | OUTPATIENT
Start: 2023-09-21 | End: 2023-09-21

## 2023-09-21 RX ORDER — ACETAMINOPHEN 500 MG
650 TABLET ORAL ONCE
Refills: 0 | Status: COMPLETED | OUTPATIENT
Start: 2023-09-21 | End: 2023-09-21

## 2023-09-21 RX ADMIN — Medication 650 MILLIGRAM(S): at 18:52

## 2023-09-21 RX ADMIN — Medication 600 MILLIGRAM(S): at 20:08

## 2023-09-21 RX ADMIN — Medication 650 MILLIGRAM(S): at 19:46

## 2023-09-21 NOTE — ED ADULT NURSE REASSESSMENT NOTE - NS ED NURSE REASSESS COMMENT FT1
Report received from BRITANY Oconnor. PT is resting comfortably in bed, breathing unlabored on room air, and speaking in complete sentences although PT is endorsing 10/10 pain. Updated PT on plan of care, PT placed in room for physical exam. Safety and comfort maintained. Call bell within reach.

## 2023-09-21 NOTE — ED PROVIDER NOTE - NSFOLLOWUPINSTRUCTIONS_ED_ALL_ED_FT
1. It is important to follow up with your primary care doctor in 1-2 days    2. bring a copy of all your results to your follow up appointments    3. you can take Tylenol as needed for pain. you can take 650mg of Tylenol every 6 hours as needed for pain. do not take more than 4000mg in a 24 hour period.     4. if your symptoms worsen, persist, or if any new symptoms develop, or if you experience any signs of distress, return to the ER right away. 1. It is important to follow up with your primary care doctor in 1-2 days  follow up with sports medicine in 1-3 days     2. bring a copy of all your results to your follow up appointments    3. you can take Tylenol as needed for pain. you can take 650mg of Tylenol every 6 hours as needed for pain. do not take more than 4000mg in a 24 hour period.     4. if your symptoms worsen, persist, or if any new symptoms develop, or if you experience any signs of distress, return to the ER right away.

## 2023-09-21 NOTE — ED PROVIDER NOTE - NS ED MD DISPO DISCHARGE
Received a call from Dr. Chriss Nieves an Intervention Pain MD.  Dr. Sara Sales is retiring and they need a MD to write the Hydrocodone for the next 30-60 days.       Dr. Wolfe notified there is a new MD coming into the practice that would be starting Home

## 2023-09-21 NOTE — ED PROVIDER NOTE - PATIENT PORTAL LINK FT
You can access the FollowMyHealth Patient Portal offered by [facility] by registering at the following website: [email or web address]. By joining [facility]’s FollowMyHealth portal, you will also be able to view your health information using other applications (apps) compatible with our system.

## 2023-09-21 NOTE — ED PROVIDER NOTE - ATTENDING APP SHARED VISIT CONTRIBUTION OF CARE
28-year-old female with no past medical history presents to the emergency department with an eversion injury.  Patient states that she was walking downstairs and she twisted her outwards.  Patient states that she was unable to bear weight after the incident.  She did not injure her knee no head strike no LOC.  Patient reports that she is not pregnant last menstrual period was end of August.  Patient denies any medication allergies.  Patient with no trauma to the arms chest back abdomen.  On exam patient is awake alert oriented x3 she has mild swelling of the ankle along the medial aspect.  Patient with 2+ DP pulse on the left.  Patient with intact range of motion of the toes.  Patient with intact toe flexion and extension bilaterally patient with limited flexion extension of the ankle secondary to pain patient with full flexion and extension of the knee.  No open wounds.  Patient capillary refill less than 2 seconds.  Patient to have x-ray concern for ankle sprain versus fracture.  Patient likely will get splint and crutches weightbearing as tolerated.

## 2023-09-21 NOTE — ED ADULT NURSE NOTE - PRO INTERPRETER NEED 2
[facility] contract with [facility] for glasses    Optical locations that accept all [facility] Health Insurance plans    Home Visit:  [facility]  [phone]      [place]  [facility] (will not do bifocals for children)  [address] [place] [address]  [phone]     [facility]  [address] [place] [address]   Phone [phone]   Fax [phone]     [facility]  [email or web address]  [address] [place] [address]  [phone]    [place]  [facility] Your [facility] 20/20  [email or web address]  [address] [place] [address] [phone]      [facility]  [address], [address] [phone]     [facility]  [email or web address]  [address]  [phone]    [facility]  [address]  [place] [address]  Phone [phone]  Fax [phone]    [facility]istry, [facility]  [address]  [place] [address]  Phone: [phone]    [facility]  [address] [place] [address]   Phone [phone]   Fax [phone]    [facility] (Glasses for $49)  [address]   [place] [address]  Phone:    [facility]  [address] [place] [address]   Phone [phone]   Fax [phone]     [facility]  [address]  [phone]    [facility]  [address]  [phone]    [facility]  [address]  [phone]    [facility]  [address] [place] [address]  [phone]          with patient English

## 2023-09-21 NOTE — ED PROVIDER NOTE - NSFOLLOWUPCLINICSTOKEN_GEN_ALL_ED_FT
Patient called and stated that she is on vacation and walking multiple miles a day. She states that her feet are swelling and it is very painful. She would like to know what to do. I advised her to lay down with her feet elevated and to contact Dr. Pennington's office. She stated that Dr. Pennington's office is not open this week and would like to know if there is anything to do in the meantime.  I advised her she would receive a call back if there was any changes our office could make.    [id number]:1-3 Days|| ||00\01||False;

## 2023-09-21 NOTE — ED ADULT NURSE NOTE - NSFALLUNIVINTERV_ED_ALL_ED
Bed/Stretcher in lowest position, wheels locked, appropriate side rails in place/Call bell, personal items and telephone in reach/Instruct patient to call for assistance before getting out of bed/chair/stretcher/Non-slip footwear applied when patient is off stretcher/[place] to call system/Physically safe environment - no spills, clutter or unnecessary equipment/Purposeful proactive rounding/Room/bathroom lighting operational, light cord in reach

## 2023-09-21 NOTE — ED PROVIDER NOTE - PHYSICAL EXAMINATION
MSK: patient with can minimally flex and extend the right ankle secondary to pain. can fully flex and extend the knee.

## 2023-09-21 NOTE — ED PROVIDER NOTE - NSFOLLOWUPCLINICS_GEN_ALL_ED_FT
[facility]  Sports Medicine  [address] [place] [address]  Phone: [phone]  Fax:   Follow Up Time: 1-3 Days

## 2023-09-21 NOTE — ED ADULT NURSE NOTE - OBJECTIVE STATEMENT
29 y/o female arrives to the ER complaining of ankle pain. Pt reports that she was walking downstairs and she twisted L foot outwards.  Patient states that she was unable to bear weight after the event  Denies head strike no LOC. Pt denies SOB, chest pain, dizziness event.  On assessment pt is well appearing, A&Ox4, speaking coherently, airway is patent, breathing spontaneously and unlabored. Skin is dry, warm. Peripheral pulses are strong and equal in bilateral extremities.

## 2023-09-21 NOTE — ED PROVIDER NOTE - OBJECTIVE STATEMENT
27 y/o female, denies pmh, presents to the ER with complaint of right ankle injury. states she was walking down the stairs and twisted her ankle outwards. denies fall or LOC. denies f/n/v/d, CP, SOB, HA, dizziness, lightheadedness, urinary symptoms.

## 2023-09-26 ENCOUNTER — APPOINTMENT (OUTPATIENT)
Dept: SPORTS MEDICINE | Facility: CLINIC | Age: 28
End: 2023-09-26
Payer: OTHER MISCELLANEOUS

## 2023-09-26 ENCOUNTER — NON-APPOINTMENT (OUTPATIENT)
Age: 28
End: 2023-09-26

## 2023-09-26 DIAGNOSIS — S93.412A SPRAIN OF CALCANEOFIBULAR LIGAMENT OF LEFT ANKLE, INITIAL ENCOUNTER: ICD-10-CM

## 2023-09-26 DIAGNOSIS — S93.492A SPRAIN OF OTHER LIGAMENT OF LEFT ANKLE, INITIAL ENCOUNTER: ICD-10-CM

## 2023-09-26 PROCEDURE — 99204 OFFICE O/P NEW MOD 45 MIN: CPT

## 2023-10-03 ENCOUNTER — APPOINTMENT (OUTPATIENT)
Dept: MRI IMAGING | Facility: CLINIC | Age: 28
End: 2023-10-03
Payer: OTHER MISCELLANEOUS

## 2023-10-03 ENCOUNTER — OUTPATIENT (OUTPATIENT)
Dept: OUTPATIENT SERVICES | Facility: HOSPITAL | Age: 28
LOS: 1 days | End: 2023-10-03
Payer: COMMERCIAL

## 2023-10-03 DIAGNOSIS — S93.412A SPRAIN OF CALCANEOFIBULAR LIGAMENT OF LEFT ANKLE, INITIAL ENCOUNTER: ICD-10-CM

## 2023-10-03 PROCEDURE — 73721 MRI JNT OF LWR EXTRE W/O DYE: CPT | Mod: 26,LT

## 2023-10-03 PROCEDURE — 73721 MRI JNT OF LWR EXTRE W/O DYE: CPT

## 2023-10-05 ENCOUNTER — NON-APPOINTMENT (OUTPATIENT)
Age: 28
End: 2023-10-05

## 2023-10-10 ENCOUNTER — APPOINTMENT (OUTPATIENT)
Dept: SPORTS MEDICINE | Facility: CLINIC | Age: 28
End: 2023-10-10

## 2023-11-10 ENCOUNTER — TRANSCRIPTION ENCOUNTER (OUTPATIENT)
Age: 28
End: 2023-11-10

## 2024-01-02 ENCOUNTER — NON-APPOINTMENT (OUTPATIENT)
Age: 29
End: 2024-01-02

## 2024-02-28 ENCOUNTER — APPOINTMENT (OUTPATIENT)
Dept: ORTHOPEDIC SURGERY | Facility: CLINIC | Age: 29
End: 2024-02-28

## 2024-03-12 ENCOUNTER — NON-APPOINTMENT (OUTPATIENT)
Age: 29
End: 2024-03-12

## 2024-04-11 ENCOUNTER — NON-APPOINTMENT (OUTPATIENT)
Age: 29
End: 2024-04-11

## 2024-05-21 ENCOUNTER — NON-APPOINTMENT (OUTPATIENT)
Age: 29
End: 2024-05-21

## 2024-08-15 ENCOUNTER — APPOINTMENT (OUTPATIENT)
Dept: ORTHOPEDIC SURGERY | Facility: CLINIC | Age: 29
End: 2024-08-15
Payer: MEDICAID

## 2024-08-15 VITALS — HEIGHT: 64 IN | BODY MASS INDEX: 25.61 KG/M2 | WEIGHT: 150 LBS

## 2024-08-15 DIAGNOSIS — G89.29 PAIN IN LEFT KNEE: ICD-10-CM

## 2024-08-15 DIAGNOSIS — M25.562 PAIN IN LEFT KNEE: ICD-10-CM

## 2024-08-15 PROCEDURE — 99213 OFFICE O/P EST LOW 20 MIN: CPT

## 2024-08-20 NOTE — PHYSICAL EXAM
[de-identified] : Left knee exam  Skin: Clean, dry, intact Inspection: No obvious malalignment, no masses, no swelling, no effusion Pulses: 2+ DP/PT pulses ROM: 0-135 degrees of flexion. No pain with deep knee flexion/extension. Tenderness: No MJLT. +LJLT. No pain over the patella facets. No pain to the quadriceps tendon. No pain to the patella tendon. No posterior knee tenderness. Stability: Stable to varus, valgus. Negative lachman testing. Negative anterior drawer, negative posterior drawer. Strength: 5/5 Q/H/TA/GS/EHL, without atrophy Neuro: In tact to light touch throughout, DTR's normal Additional tests: Negative McMurrays test, Negative patellar grind test.  [de-identified] : The following radiographs were ordered and read by me during this patients visit. I reviewed each radiograph in detail with the patient and discussed the findings as highlighted below.   4 views of the left knee were obtained, 08/15/2024, that show no acute fracture or dislocation. There is no medial, no lateral and mild patellofemoral degenerative changes seen. There is no significant malalignment. No significant other obvious osseous abnormality, otherwise unremarkable.

## 2024-08-20 NOTE — PHYSICAL EXAM
[de-identified] : Left knee exam  Skin: Clean, dry, intact Inspection: No obvious malalignment, no masses, no swelling, no effusion Pulses: 2+ DP/PT pulses ROM: 0-135 degrees of flexion. No pain with deep knee flexion/extension. Tenderness: No MJLT. +LJLT. No pain over the patella facets. No pain to the quadriceps tendon. No pain to the patella tendon. No posterior knee tenderness. Stability: Stable to varus, valgus. Negative lachman testing. Negative anterior drawer, negative posterior drawer. Strength: 5/5 Q/H/TA/GS/EHL, without atrophy Neuro: In tact to light touch throughout, DTR's normal Additional tests: Negative McMurrays test, Negative patellar grind test.  [de-identified] : The following radiographs were ordered and read by me during this patients visit. I reviewed each radiograph in detail with the patient and discussed the findings as highlighted below.   4 views of the left knee were obtained, 08/15/2024, that show no acute fracture or dislocation. There is no medial, no lateral and mild patellofemoral degenerative changes seen. There is no significant malalignment. No significant other obvious osseous abnormality, otherwise unremarkable.

## 2024-08-20 NOTE — DISCUSSION/SUMMARY
[de-identified] : 30 y/o female with left knee pain status post saucerization/repair lateral discoid meniscus  Patient continues to have pain through the lateral aspect of the left knee.  I discussed that this can be due to prior pathology and need for surgical intervention for her degenerative discoid meniscus injury.  She ultimately underwent repair of the remnant lateral meniscus, but still has persistent symptoms through the lateral compartment.  Given persistent symptoms, we did discuss consideration of advanced imaging to reevaluate internal derangement within the lateral compartment and to stratify the patient for additional management.  Patient is agreeable.  Recommendations: Activity to tolerance. Ice/NSAIDs as needed. MRI imaging  Follow-up after MRI.

## 2024-08-20 NOTE — DISCUSSION/SUMMARY
[de-identified] : 30 y/o female with left knee pain status post saucerization/repair lateral discoid meniscus  Patient continues to have pain through the lateral aspect of the left knee.  I discussed that this can be due to prior pathology and need for surgical intervention for her degenerative discoid meniscus injury.  She ultimately underwent repair of the remnant lateral meniscus, but still has persistent symptoms through the lateral compartment.  Given persistent symptoms, we did discuss consideration of advanced imaging to reevaluate internal derangement within the lateral compartment and to stratify the patient for additional management.  Patient is agreeable.  Recommendations: Activity to tolerance. Ice/NSAIDs as needed. MRI imaging  Follow-up after MRI.

## 2024-08-20 NOTE — HISTORY OF PRESENT ILLNESS
[de-identified] : 29-year-old female presents with acute on chronic left knee pain for the past 2 months. She reports no recent injury. Her history includes a left knee lateral meniscus saucerization/repair on 7/7/20. She was last seen in 2022 for knee pain and was referred for an MRI, which she did not obtain. Although she was doing well until recently, the pain has become constant and worsens with walking and while lying down. Ibuprofen provides no relief.

## 2024-08-20 NOTE — ADDENDUM
[FreeTextEntry1] : This note was written by Shayna Campbell on 08/15/2024 acting solely as a scribe for Dr. Kenn Oliveira.  All medical record entries made by the Scribe were at my, Dr. Kenn Oliveira, direction and personally dictated by me on 08/15/2024. I have personally reviewed the chart and agree that the record accurately reflects my personal performance of the history, physical exam, assessment and plan.

## 2024-08-20 NOTE — HISTORY OF PRESENT ILLNESS
[de-identified] : 29-year-old female presents with acute on chronic left knee pain for the past 2 months. She reports no recent injury. Her history includes a left knee lateral meniscus saucerization/repair on 7/7/20. She was last seen in 2022 for knee pain and was referred for an MRI, which she did not obtain. Although she was doing well until recently, the pain has become constant and worsens with walking and while lying down. Ibuprofen provides no relief.

## 2024-09-05 ENCOUNTER — APPOINTMENT (OUTPATIENT)
Dept: MRI IMAGING | Facility: CLINIC | Age: 29
End: 2024-09-05

## 2024-09-05 ENCOUNTER — OUTPATIENT (OUTPATIENT)
Dept: OUTPATIENT SERVICES | Facility: HOSPITAL | Age: 29
LOS: 1 days | End: 2024-09-05
Payer: MEDICAID

## 2024-09-05 DIAGNOSIS — M25.562 PAIN IN LEFT KNEE: ICD-10-CM

## 2024-09-05 DIAGNOSIS — S83.282A OTHER TEAR OF LATERAL MENISCUS, CURRENT INJURY, LEFT KNEE, INITIAL ENCOUNTER: ICD-10-CM

## 2024-09-05 DIAGNOSIS — Q68.6 DISCOID MENISCUS: ICD-10-CM

## 2024-09-05 PROCEDURE — 73721 MRI JNT OF LWR EXTRE W/O DYE: CPT | Mod: 26,LT

## 2024-09-05 PROCEDURE — 73721 MRI JNT OF LWR EXTRE W/O DYE: CPT

## 2024-09-13 ENCOUNTER — NON-APPOINTMENT (OUTPATIENT)
Age: 29
End: 2024-09-13

## 2024-09-19 ENCOUNTER — APPOINTMENT (OUTPATIENT)
Dept: ORTHOPEDIC SURGERY | Facility: CLINIC | Age: 29
End: 2024-09-19

## 2024-10-08 ENCOUNTER — NON-APPOINTMENT (OUTPATIENT)
Age: 29
End: 2024-10-08

## 2024-12-26 ENCOUNTER — APPOINTMENT (OUTPATIENT)
Dept: UROLOGY | Facility: CLINIC | Age: 29
End: 2024-12-26

## 2024-12-31 ENCOUNTER — APPOINTMENT (OUTPATIENT)
Dept: UROLOGY | Facility: CLINIC | Age: 29
End: 2024-12-31

## 2025-01-06 ENCOUNTER — APPOINTMENT (OUTPATIENT)
Dept: ORTHOPEDIC SURGERY | Facility: CLINIC | Age: 30
End: 2025-01-06
Payer: MEDICAID

## 2025-01-06 VITALS — HEIGHT: 64 IN | BODY MASS INDEX: 27.31 KG/M2 | WEIGHT: 160 LBS

## 2025-01-06 DIAGNOSIS — M25.562 PAIN IN LEFT KNEE: ICD-10-CM

## 2025-01-06 DIAGNOSIS — G89.29 PAIN IN LEFT KNEE: ICD-10-CM

## 2025-01-06 PROCEDURE — 99213 OFFICE O/P EST LOW 20 MIN: CPT

## 2025-01-06 RX ORDER — DICLOFENAC SODIUM 75 MG/1
75 TABLET, DELAYED RELEASE ORAL TWICE DAILY
Qty: 30 | Refills: 0 | Status: ACTIVE | COMMUNITY
Start: 2025-01-06 | End: 1900-01-01

## 2025-01-14 ENCOUNTER — APPOINTMENT (OUTPATIENT)
Dept: UROLOGY | Facility: CLINIC | Age: 30
End: 2025-01-14

## 2025-02-25 ENCOUNTER — RX RENEWAL (OUTPATIENT)
Age: 30
End: 2025-02-25

## 2025-03-26 NOTE — ED ADULT NURSE NOTE - DISCHARGE DATE/TIME
Chief Complaints and History of Present Illnesses   Patient presents with    Ocular Hypertension Follow Up     Chief Complaint(s) and History of Present Illness(es)       Ocular Hypertension Follow Up              Laterality: both eyes              Comments    Pt. States that she is doing well. VA is much improved since seeing occupational therapist. No pain BE. No new flashes or floaters BE. Some mild dryness BE.   Ángela Thakur COT 9:17 AM March 26, 2025                      
15-Mar-2020 05:24